# Patient Record
Sex: FEMALE | Race: WHITE | Employment: FULL TIME | URBAN - NONMETROPOLITAN AREA
[De-identification: names, ages, dates, MRNs, and addresses within clinical notes are randomized per-mention and may not be internally consistent; named-entity substitution may affect disease eponyms.]

---

## 2017-05-09 RX ORDER — LORATADINE 10 MG/1
TABLET ORAL
Qty: 30 TABLET | Refills: 3 | Status: SHIPPED | OUTPATIENT
Start: 2017-05-09 | End: 2018-06-29 | Stop reason: SDUPTHER

## 2017-05-10 RX ORDER — SERTRALINE HYDROCHLORIDE 100 MG/1
100 TABLET, FILM COATED ORAL DAILY
COMMUNITY
End: 2017-05-10 | Stop reason: SDUPTHER

## 2017-05-10 RX ORDER — SERTRALINE HYDROCHLORIDE 100 MG/1
TABLET, FILM COATED ORAL
Qty: 45 TABLET | Refills: 5 | Status: SHIPPED | OUTPATIENT
Start: 2017-05-10

## 2017-05-19 DIAGNOSIS — G47.30 UNSPECIFIED SLEEP APNEA: ICD-10-CM

## 2017-05-19 DIAGNOSIS — E03.9 UNSPECIFIED HYPOTHYROIDISM: ICD-10-CM

## 2017-05-19 RX ORDER — LEVOTHYROXINE SODIUM 0.15 MG/1
100 TABLET ORAL
COMMUNITY
Start: 2017-04-08

## 2017-05-19 RX ORDER — VITAMIN A ACETATE, .BETA.-CAROTENE, ASCORBIC ACID, CHOLECALCIFEROL, .ALPHA.-TOCOPHEROL ACETATE, DL-, THIAMINE MONONITRATE, RIBOFLAVIN, NIACINAMIDE, PYRIDOXINE HYDROCHLORIDE, FOLIC ACID, CYANOCOBALAMIN, CALCIUM CARBONATE, FERROUS FUMARATE, ZINC OXIDE, AND CUPRIC OXIDE 2000; 2000; 120; 400; 22; 1.84; 3; 20; 10; 1; 12; 200; 27; 25; 2 [IU]/1; [IU]/1; MG/1; [IU]/1; MG/1; MG/1; MG/1; MG/1; MG/1; MG/1; UG/1; MG/1; MG/1; MG/1; MG/1
TABLET ORAL
COMMUNITY
Start: 2017-04-24

## 2017-05-19 RX ORDER — CABERGOLINE 0.5 MG/1
TABLET ORAL
COMMUNITY
Start: 2017-04-06

## 2017-05-19 RX ORDER — MONTELUKAST SODIUM 10 MG/1
TABLET ORAL
COMMUNITY
Start: 2017-05-07 | End: 2017-10-09 | Stop reason: SDUPTHER

## 2017-05-19 RX ORDER — URSODIOL 500 MG/1
TABLET, FILM COATED ORAL
COMMUNITY
Start: 2017-04-24 | End: 2017-09-14 | Stop reason: ALTCHOICE

## 2017-05-19 RX ORDER — METHOCARBAMOL 750 MG/1
TABLET ORAL
COMMUNITY
Start: 2017-04-29

## 2017-05-25 ENCOUNTER — OFFICE VISIT (OUTPATIENT)
Dept: FAMILY MEDICINE CLINIC | Age: 47
End: 2017-05-25
Payer: MEDICARE

## 2017-05-25 VITALS
WEIGHT: 191 LBS | SYSTOLIC BLOOD PRESSURE: 102 MMHG | BODY MASS INDEX: 30.7 KG/M2 | HEART RATE: 64 BPM | HEIGHT: 66 IN | DIASTOLIC BLOOD PRESSURE: 60 MMHG

## 2017-05-25 DIAGNOSIS — Z13.220 SCREENING FOR LIPID DISORDERS: ICD-10-CM

## 2017-05-25 DIAGNOSIS — G47.30 UNSPECIFIED SLEEP APNEA: Primary | ICD-10-CM

## 2017-05-25 DIAGNOSIS — Z13.1 SCREENING FOR DIABETES MELLITUS: ICD-10-CM

## 2017-05-25 PROCEDURE — 99213 OFFICE O/P EST LOW 20 MIN: CPT | Performed by: FAMILY MEDICINE

## 2017-05-31 ENCOUNTER — HOSPITAL ENCOUNTER (OUTPATIENT)
Dept: SLEEP CENTER | Age: 47
Discharge: HOME | End: 2017-05-31
Payer: MEDICARE

## 2017-05-31 DIAGNOSIS — G47.30 UNSPECIFIED SLEEP APNEA: ICD-10-CM

## 2017-05-31 PROCEDURE — 95811 POLYSOM 6/>YRS CPAP 4/> PARM: CPT

## 2017-06-01 VITALS
DIASTOLIC BLOOD PRESSURE: 63 MMHG | RESPIRATION RATE: 20 BRPM | TEMPERATURE: 96.4 F | HEART RATE: 51 BPM | BODY MASS INDEX: 31.82 KG/M2 | SYSTOLIC BLOOD PRESSURE: 104 MMHG | WEIGHT: 191 LBS | HEIGHT: 65 IN

## 2017-09-14 ENCOUNTER — OFFICE VISIT (OUTPATIENT)
Dept: FAMILY MEDICINE CLINIC | Age: 47
End: 2017-09-14
Payer: MEDICARE

## 2017-09-14 VITALS
RESPIRATION RATE: 14 BRPM | WEIGHT: 193.1 LBS | SYSTOLIC BLOOD PRESSURE: 124 MMHG | DIASTOLIC BLOOD PRESSURE: 82 MMHG | HEIGHT: 65 IN | BODY MASS INDEX: 32.17 KG/M2 | TEMPERATURE: 99.1 F | HEART RATE: 72 BPM

## 2017-09-14 DIAGNOSIS — G47.00 INSOMNIA, UNSPECIFIED TYPE: Primary | ICD-10-CM

## 2017-09-14 DIAGNOSIS — K08.89 PAIN, DENTAL: ICD-10-CM

## 2017-09-14 PROCEDURE — 99213 OFFICE O/P EST LOW 20 MIN: CPT | Performed by: NURSE PRACTITIONER

## 2017-09-14 RX ORDER — ZOLPIDEM TARTRATE 5 MG/1
5 TABLET ORAL NIGHTLY PRN
Qty: 30 TABLET | Refills: 0 | Status: SHIPPED | OUTPATIENT
Start: 2017-09-14 | End: 2017-10-14

## 2017-09-14 ASSESSMENT — ENCOUNTER SYMPTOMS: SINUS PRESSURE: 1

## 2017-09-16 ASSESSMENT — ENCOUNTER SYMPTOMS
COUGH: 0
SHORTNESS OF BREATH: 0
WHEEZING: 0

## 2017-10-09 RX ORDER — MONTELUKAST SODIUM 10 MG/1
10 TABLET ORAL NIGHTLY
Qty: 30 TABLET | Refills: 5 | Status: SHIPPED | OUTPATIENT
Start: 2017-10-09

## 2018-10-05 RX ORDER — LORATADINE 10 MG/1
TABLET ORAL
Qty: 30 TABLET | Refills: 5 | Status: SHIPPED | OUTPATIENT
Start: 2018-10-05

## 2021-12-23 ENCOUNTER — HOSPITAL ENCOUNTER (OUTPATIENT)
Dept: MAMMOGRAPHY | Age: 51
Discharge: HOME | End: 2021-12-25
Payer: COMMERCIAL

## 2021-12-23 DIAGNOSIS — Z12.31 ENCOUNTER FOR SCREENING MAMMOGRAM FOR MALIGNANT NEOPLASM OF BREAST: ICD-10-CM

## 2021-12-23 PROCEDURE — 77067 SCR MAMMO BI INCL CAD: CPT

## 2022-01-28 ENCOUNTER — TELEPHONE (OUTPATIENT)
Dept: SURGERY | Age: 52
End: 2022-01-28

## 2022-07-12 ENCOUNTER — OFFICE VISIT (OUTPATIENT)
Dept: OTOLARYNGOLOGY | Age: 52
End: 2022-07-12
Payer: COMMERCIAL

## 2022-07-12 VITALS
TEMPERATURE: 60.8 F | DIASTOLIC BLOOD PRESSURE: 74 MMHG | SYSTOLIC BLOOD PRESSURE: 124 MMHG | BODY MASS INDEX: 32.16 KG/M2 | RESPIRATION RATE: 14 BRPM | WEIGHT: 193 LBS | HEART RATE: 61 BPM

## 2022-07-12 DIAGNOSIS — Z96.22 RETAINED MYRINGOTOMY TUBE IN LEFT EAR: ICD-10-CM

## 2022-07-12 DIAGNOSIS — H69.83 DYSFUNCTION OF BOTH EUSTACHIAN TUBES: ICD-10-CM

## 2022-07-12 DIAGNOSIS — Z01.818 PRE-OP TESTING: Primary | ICD-10-CM

## 2022-07-12 PROCEDURE — 99203 OFFICE O/P NEW LOW 30 MIN: CPT | Performed by: OTOLARYNGOLOGY

## 2022-07-12 RX ORDER — LAMOTRIGINE 100 MG/1
100 TABLET ORAL DAILY
COMMUNITY

## 2022-07-12 RX ORDER — ZOLPIDEM TARTRATE 10 MG/1
10 TABLET ORAL DAILY
COMMUNITY

## 2022-07-12 RX ORDER — ESCITALOPRAM OXALATE 20 MG/1
20 TABLET ORAL DAILY
COMMUNITY

## 2022-07-12 NOTE — PROGRESS NOTES
2022 8:19 AM EDRHINA Lazaro (:  1970) is a 50 y.o. female,New patient, here for evaluation of the following chief complaint(s):  Ear Problem (left tube fell inside eardrum )      ASSESSMENT/PLAN:  1. Pre-op testing    2. Retained myringotomy tube in left ear    3. Dysfunction of both eustachian tubes      1. Pre-op testing  -     CBC with Auto Differential; Future  -     Basic Metabolic Panel; Future  -     EKG 12 lead; Future  2. Retained myringotomy tube in left ear  3. Dysfunction of both eustachian tubes    Arrange for replacement of bilateral T tubes in the OR  Cbc, bmp, ekg     Discussed risks, benefits, indications, alternatives of myringotomy with ear tubes including but not limited to need for repeat tubes, otorrhea, pain, damage to surrounding structures, viral and bacterial upper respiratory infections that may cause ear infection despite having ear tubes in place, tube falling out too early, tube staying in too long, 1% risk of tympanic membrane perforation, mucus drainage, bleeding. No follow-ups on file. SUBJECTIVE/OBJECTIVE:  HPI   54yo woman bilateral T tubes by Dr. Elysa Koyanagi in . Non smoker. Has previously seen Dr. Timi Crowe. Has strong history of environmental allergies and gets itchy ears. Has worsening hearing loss in the left ear. Is wondering if the t tube on the right is out. The left tube is known to be in the middle ear.      Past Medical History:   Diagnosis Date    Asthma     Depression     Pituitary tumor     Seasonal allergies      Past Surgical History:   Procedure Laterality Date    DILATION AND CURETTAGE OF UTERUS      hysteroscopy d&c 13    HYSTERECTOMY (CERVIX STATUS UNKNOWN)  2015    SLEEVE GASTROPLASTY  2017    TRACHEOSTOMY      age 4    TYMPANOSTOMY TUBE PLACEMENT      age 3    TYMPANOSTOMY TUBE PLACEMENT      t-tubes     Social History     Tobacco History     Smoking Status  Never Smoker    Smokeless Tobacco Use  Never Used Alcohol History     Alcohol Use Status  No          Drug Use     Drug Use Status  No          Sexual Activity     Sexually Active  Yes              Family History   Problem Relation Age of Onset    No Known Problems Mother     No Known Problems Father     Coronary Art Dis Maternal Grandmother     Coronary Art Dis Maternal Grandfather     No Known Problems Paternal Grandmother     No Known Problems Paternal Grandfather     Breast Cancer Sister     No Known Problems Daughter     No Known Problems Maternal Aunt      Current Outpatient Medications   Medication Instructions    cabergoline (DOSTINEX) 0.5 MG tablet No dose, route, or frequency recorded.  Cyanocobalamin (VITAMIN B 12 PO) 1,000 mcg, SubLINGual    D3-50 29228 UNITS CAPS No dose, route, or frequency recorded.  escitalopram (LEXAPRO) 20 mg, Oral, DAILY    fluticasone (FLONASE) 50 MCG/ACT nasal spray PLACE TWO SPRAYS IN EACH NOSTRIL ONCE DAILY    IBUPROFEN PRN    lamoTRIgine (LAMICTAL) 100 mg, Oral, DAILY    levothyroxine (SYNTHROID) 100 mcg    loratadine (CLARITIN) 10 MG tablet TAKE ONE TABLET BY MOUTH DAILY    montelukast (SINGULAIR) 10 mg, Oral, NIGHTLY    Prenatal Vit-Fe Fumarate-FA (PNV PRENATAL PLUS MULTIVITAMIN) 27-1 MG TABS No dose, route, or frequency recorded.  sertraline (ZOLOFT) 100 MG tablet Take 1 1/2 tabs daily    zolpidem (AMBIEN) 10 mg, Oral, DAILY     Allergies   Allergen Reactions    Adhesive Tape Itching    Codeine Hives    Hydrocodone Hives    Hydromorphone Itching    Lactose Nausea Only       ENT ROS: positive for - hearing change    General: The patient is found to be alert and normally responsive female. Hearing: grossly normal   Voice: Clear   Skin: The skin has normal colour and turgor. Face: The facial contour is symmetric at rest and with movement. Ears: The pinnae have normal contours.     AD: EAC clear, TM with t tube in place, patent, extensive crusting around shaft   AS: EAC clear, TM intact, retraction, t tube in middle ear space   Eye: The ocular movements are full and symmetric, the conjunctiva is unremarkable; sclera are anicteric, pupillary response is symmetric. No nystagmus is found. Nose:   The external nasal contour is normal  The nasal mucosa has a normal appearance. The nasal septum is straight. The turbinates have a normal appearance  The nares are patent without evidence of polyposis   Oral cavity:   The dentition is healthy. The oral mucosa is without lesions;  the tongue is symmetric with full mobility and is without fasciculation. The soft palate is symmetric. The oropharynx is unremarkable. Neck: The neck has a normal contour; no masses are found on palpation        An electronic signature was used to authenticate this note.     --Maranda Servin MD     7/12/2022 8:19 AM EDT

## 2022-08-22 ENCOUNTER — HOSPITAL ENCOUNTER (OUTPATIENT)
Dept: LAB | Age: 52
Discharge: HOME | End: 2022-08-22
Payer: COMMERCIAL

## 2022-08-22 ENCOUNTER — TELEPHONE (OUTPATIENT)
Dept: OTOLARYNGOLOGY | Age: 52
End: 2022-08-22

## 2022-08-22 DIAGNOSIS — Z01.818 PRE-OP TESTING: ICD-10-CM

## 2022-08-22 LAB
ABSOLUTE EOS #: 0.19 K/UL (ref 0–0.44)
ABSOLUTE IMMATURE GRANULOCYTE: 0.12 K/UL (ref 0–0.3)
ABSOLUTE LYMPH #: 2.97 K/UL (ref 1.1–3.7)
ABSOLUTE MONO #: 0.66 K/UL (ref 0.1–1.2)
ANION GAP SERPL CALCULATED.3IONS-SCNC: 7 MMOL/L (ref 9–17)
BASOPHILS # BLD: 1 % (ref 0–2)
BASOPHILS ABSOLUTE: 0.07 K/UL (ref 0–0.2)
BUN BLDV-MCNC: 10 MG/DL (ref 6–20)
BUN/CREAT BLD: 13 (ref 9–20)
CALCIUM SERPL-MCNC: 9.5 MG/DL (ref 8.6–10.4)
CHLORIDE BLD-SCNC: 103 MMOL/L (ref 98–107)
CO2: 28 MMOL/L (ref 20–31)
CREAT SERPL-MCNC: 0.78 MG/DL (ref 0.5–0.9)
EOSINOPHILS RELATIVE PERCENT: 2 % (ref 1–4)
GFR AFRICAN AMERICAN: >60 ML/MIN
GFR NON-AFRICAN AMERICAN: >60 ML/MIN
GFR SERPL CREATININE-BSD FRML MDRD: ABNORMAL ML/MIN/{1.73_M2}
GLUCOSE BLD-MCNC: 74 MG/DL (ref 70–99)
HCT VFR BLD CALC: 38.7 % (ref 36.3–47.1)
HEMOGLOBIN: 12.9 G/DL (ref 11.9–15.1)
IMMATURE GRANULOCYTES: 2 %
LYMPHOCYTES # BLD: 36 % (ref 24–43)
MCH RBC QN AUTO: 30.9 PG (ref 25.2–33.5)
MCHC RBC AUTO-ENTMCNC: 33.3 G/DL (ref 25.2–33.5)
MCV RBC AUTO: 92.6 FL (ref 82.6–102.9)
MONOCYTES # BLD: 8 % (ref 3–12)
NRBC AUTOMATED: 0 PER 100 WBC
PDW BLD-RTO: 12.6 % (ref 11.8–14.4)
PLATELET # BLD: 253 K/UL (ref 138–453)
PMV BLD AUTO: 9.2 FL (ref 8.1–13.5)
POTASSIUM SERPL-SCNC: 4 MMOL/L (ref 3.7–5.3)
RBC # BLD: 4.18 M/UL (ref 3.95–5.11)
SEG NEUTROPHILS: 51 % (ref 36–65)
SEGMENTED NEUTROPHILS ABSOLUTE COUNT: 4.16 K/UL (ref 1.5–8.1)
SODIUM BLD-SCNC: 138 MMOL/L (ref 135–144)
WBC # BLD: 8.2 K/UL (ref 3.5–11.3)

## 2022-08-22 PROCEDURE — 36415 COLL VENOUS BLD VENIPUNCTURE: CPT

## 2022-08-22 PROCEDURE — 85025 COMPLETE CBC W/AUTO DIFF WBC: CPT

## 2022-08-22 PROCEDURE — 80048 BASIC METABOLIC PNL TOTAL CA: CPT

## 2022-08-22 NOTE — TELEPHONE ENCOUNTER
[facility]    Pre-Operative Evaluation/Consultation    Name:  Mary Ortega                                         Age:  50 y.o. MRN:  [id number]       :  1970   Date:  2022         Sex: female    There were no encounter diagnoses. Surgeon:  Dr. Radha Gordon  Procedure (Planned):  Bilateral Myringotomy with T tubes  Date Scheduled surgery: 2022    Attending : No att. providers found    Primary Physician: [place]  Cardiologist: None    Type of Anesthesia Requested: General    Patient Medical history: Allergies   Allergen Reactions    Adhesive Tape Itching    Codeine Hives    Hydrocodone Hives    Hydromorphone Itching    Lactose Nausea Only     Patient Active Problem List   Diagnosis    Sleep apnea    Hypothyroidism     Past Medical History:   Diagnosis Date    Asthma     Depression     Pituitary tumor     Seasonal allergies      Past Surgical History:   Procedure Laterality Date    DILATION AND CURETTAGE OF UTERUS      hysteroscopy d&c 15    HYSTERECTOMY (CERVIX STATUS UNKNOWN)      SLEEVE GASTROPLASTY  2017    TRACHEOSTOMY      age 3    TYMPANOSTOMY TUBE PLACEMENT      age 3    TYMPANOSTOMY TUBE PLACEMENT      t-tubes     Social History     Tobacco Use    Smoking status: Never    Smokeless tobacco: Never   Substance Use Topics    Alcohol use: No    Drug use: No     Medications:  Current Outpatient Medications   Medication Sig Dispense Refill    zolpidem (AMBIEN) 10 MG tablet Take 10 mg by mouth daily.       lamoTRIgine (LAMICTAL) 100 MG tablet Take 100 mg by mouth daily      escitalopram (LEXAPRO) 20 MG tablet Take 20 mg by mouth daily      loratadine (CLARITIN) 10 MG tablet TAKE ONE TABLET BY MOUTH DAILY 30 tablet 5    montelukast (SINGULAIR) 10 MG tablet Take 1 tablet by mouth nightly 30 tablet 5    Cyanocobalamin (VITAMIN B 12 PO) Place 1,000 mcg under the tongue      fluticasone (FLONASE) 50 MCG/ACT nasal spray PLACE TWO SPRAYS IN EACH NOSTRIL ONCE DAILY 1 Bottle 5    levothyroxine (SYNTHROID) 150 MCG tablet 100 mcg       Prenatal Vit-Fe Fumarate-FA (PNV PRENATAL PLUS MULTIVITAMIN) 27-1 MG TABS       D3-50 98822 UNITS CAPS       cabergoline (DOSTINEX) 0.5 MG tablet       sertraline (ZOLOFT) 100 MG tablet Take 1 1/2 tabs daily (Patient not taking: Reported on 7/12/2022) 45 tablet 5    IBUPROFEN by Does not apply route as needed. No current facility-administered medications for this visit. Scheduled Meds:  Continuous Infusions:  PRN Meds:. Prior to Admission medications    Medication Sig Start Date End Date Taking? Authorizing Provider   zolpidem (AMBIEN) 10 MG tablet Take 10 mg by mouth daily. Historical Provider, MD   lamoTRIgine (LAMICTAL) 100 MG tablet Take 100 mg by mouth daily    Historical Provider, MD   escitalopram (LEXAPRO) 20 MG tablet Take 20 mg by mouth daily    Historical Provider, MD   loratadine (CLARITIN) 10 MG tablet TAKE ONE TABLET BY MOUTH DAILY 10/5/18   Demetris Gee MD   montelukast (SINGULAIR) 10 MG tablet Take 1 tablet by mouth nightly 10/9/17   Gertrudis Peng MD   Cyanocobalamin (VITAMIN B 12 PO) Place 1,000 mcg under the tongue    Historical Provider, MD   fluticasone (FLONASE) 50 MCG/ACT nasal spray PLACE TWO SPRAYS IN EACH NOSTRIL ONCE DAILY 8/21/17   Gertrudis Peng MD   levothyroxine (SYNTHROID) 150 MCG tablet 100 mcg  4/8/17   Historical Provider, MD   Prenatal Vit-Fe Fumarate-FA (PNV PRENATAL PLUS MULTIVITAMIN) 27-1 MG TABS  4/24/17   Historical Provider, MD   D3-50 74137 UNITS CAPS  4/29/17   Historical Provider, MD   cabergoline (DOSTINEX) 0.5 MG tablet  4/6/17   Historical Provider, MD   sertraline (ZOLOFT) 100 MG tablet Take 1 1/2 tabs daily  Patient not taking: Reported on 7/12/2022 5/10/17   Gertrudis Peng MD   IBUPROFEN by Does not apply route as needed.     Historical Provider, MD     Vital Signs (Current) [unfilled]    Weight:   Wt Readings from Last 1 Encounters:   07/12/22 193 lb (87.5 kg) Height:   Ht Readings from Last 1 Encounters:   09/14/17 5' 4.96\" (1.65 m)      BMI:  There is no height or weight on file to calculate BMI. Estimated body mass index is 32.16 kg/m² as calculated from the following:    Height as of 9/14/17: 5' 4.96\" (1.65 m). Weight as of 7/12/22: 193 lb (87.5 kg). body mass index is unknown because there is no height or weight on file. Cardiac Clearance: None   Medical Clearance:None   Appointment for surgery Clearance scheduled for:None     Preoperative Testing: These are the current and completed labs:  CBC:   Lab Results   Component Value Date/Time    WBC 8.2 08/22/2022 01:55 PM    RBC 4.18 08/22/2022 01:55 PM    HGB 12.9 08/22/2022 01:55 PM    HCT 38.7 08/22/2022 01:55 PM    MCV 92.6 08/22/2022 01:55 PM    RDW 12.6 08/22/2022 01:55 PM     08/22/2022 01:55 PM     CMP:   Lab Results   Component Value Date/Time     08/22/2022 01:55 PM    K 4.0 08/22/2022 01:55 PM     08/22/2022 01:55 PM    CO2 28 08/22/2022 01:55 PM    BUN 10 08/22/2022 01:55 PM    CREATININE 0.78 08/22/2022 01:55 PM    GFRAA >60 08/22/2022 01:55 PM    LABGLOM >60 08/22/2022 01:55 PM    GLUCOSE 74 08/22/2022 01:55 PM    CALCIUM 9.5 08/22/2022 01:55 PM     POC Tests: No results for input(s): POCGLU, POCNA, POCK, POCCL, POCBUN, POCHEMO, POCHCT in the last 72 hours.   Coags  No results found for: PROTIME, INR, APTT  HCG (If Applicable)   Lab Results   Component Value Date    PREGTESTUR NEGATIVE 07/19/2013      ABGs No results found for: PHART, PO2ART, XYN0ZEX, DWV8BOQ, BEART, S5BZCWTW   Type & Screen (If Applicable)  No results found for: Vaughn Torres    Additional ordered pre-operative testing:  [x]CBC    []ABG      [x] BMP   []URINALYSIS   []CMP    []HCG   []COAGS PT/INR  []T&C  []LFTs   []TYPE AND SCREEN    [x] EKG  [] Chest X-Ray  [] Other Radiology    [] Sent to Hospitalist None  [x] Sent to Anesthesia for your review: None   [] Additional Orders: None     Comments:None

## 2022-08-23 ENCOUNTER — ANESTHESIA EVENT (OUTPATIENT)
Dept: OPERATING ROOM | Age: 52
End: 2022-08-23
Payer: COMMERCIAL

## 2022-08-23 ENCOUNTER — HOSPITAL ENCOUNTER (OUTPATIENT)
Dept: NON INVASIVE DIAGNOSTICS | Age: 52
Discharge: HOME | End: 2022-08-23
Payer: COMMERCIAL

## 2022-08-23 DIAGNOSIS — Z01.818 PRE-OP TESTING: ICD-10-CM

## 2022-08-23 PROCEDURE — 93005 ELECTROCARDIOGRAM TRACING: CPT

## 2022-08-23 RX ORDER — SODIUM CHLORIDE, SODIUM LACTATE, POTASSIUM CHLORIDE, CALCIUM CHLORIDE 600; 310; 30; 20 MG/100ML; MG/100ML; MG/100ML; MG/100ML
INJECTION, SOLUTION INTRAVENOUS CONTINUOUS
Status: CANCELLED | OUTPATIENT
Start: 2022-08-23

## 2022-08-23 RX ORDER — SODIUM CHLORIDE 0.9 % (FLUSH) 0.9 %
5-40 SYRINGE (ML) INJECTION EVERY 12 HOURS SCHEDULED
Status: CANCELLED | OUTPATIENT
Start: 2022-08-23

## 2022-08-23 RX ORDER — SODIUM CHLORIDE 9 MG/ML
INJECTION, SOLUTION INTRAVENOUS PRN
Status: CANCELLED | OUTPATIENT
Start: 2022-08-23

## 2022-08-23 RX ORDER — SODIUM CHLORIDE 0.9 % (FLUSH) 0.9 %
5-40 SYRINGE (ML) INJECTION PRN
Status: CANCELLED | OUTPATIENT
Start: 2022-08-23

## 2022-08-23 NOTE — H&P
Use Status  No                  Sexual Activity           Sexually Active  Yes                  Family History         Family History   Problem Relation Age of Onset    No Known Problems Mother      No Known Problems Father      Coronary Art Dis Maternal Grandmother      Coronary Art Dis Maternal Grandfather      No Known Problems Paternal Grandmother      No Known Problems Paternal Grandfather      Breast Cancer Sister      No Known Problems Daughter      No Known Problems Maternal Aunt                Current Outpatient Medications   Medication Instructions    cabergoline (DOSTINEX) 0.5 MG tablet No dose, route, or frequency recorded. Cyanocobalamin (VITAMIN B 12 PO) 1,000 mcg, SubLINGual    D3-50 83573 UNITS CAPS No dose, route, or frequency recorded. escitalopram (LEXAPRO) 20 mg, Oral, DAILY    fluticasone (FLONASE) 50 MCG/ACT nasal spray PLACE TWO SPRAYS IN EACH NOSTRIL ONCE DAILY    IBUPROFEN PRN    lamoTRIgine (LAMICTAL) 100 mg, Oral, DAILY    levothyroxine (SYNTHROID) 100 mcg    loratadine (CLARITIN) 10 MG tablet TAKE ONE TABLET BY MOUTH DAILY    montelukast (SINGULAIR) 10 mg, Oral, NIGHTLY    Prenatal Vit-Fe Fumarate-FA (PNV PRENATAL PLUS MULTIVITAMIN) 27-1 MG TABS No dose, route, or frequency recorded. sertraline (ZOLOFT) 100 MG tablet Take 1 1/2 tabs daily    zolpidem (AMBIEN) 10 mg, Oral, DAILY           Allergies   Allergen Reactions    Adhesive Tape Itching    Codeine Hives    Hydrocodone Hives    Hydromorphone Itching    Lactose Nausea Only         ENT ROS: positive for - hearing change     General: The patient is found to be alert and normally responsive female. Hearing: grossly normal   Voice: Clear   Skin: The skin has normal colour and turgor. Face: The facial contour is symmetric at rest and with movement. Ears: The pinnae have normal contours.     AD: EAC clear, TM with t tube in place, patent, extensive crusting around shaft   AS: EAC clear, TM intact, retraction, t tube in middle ear space   Eye: The ocular movements are full and symmetric, the conjunctiva is unremarkable; sclera are anicteric, pupillary response is symmetric. No nystagmus is found. Nose:   The external nasal contour is normal  The nasal mucosa has a normal appearance. The nasal septum is straight. The turbinates have a normal appearance  The nares are patent without evidence of polyposis   Oral cavity:   The dentition is healthy. The oral mucosa is without lesions;  the tongue is symmetric with full mobility and is without fasciculation. The soft palate is symmetric. The oropharynx is unremarkable.   Neck: The neck has a normal contour; no masses are found on palpation

## 2022-08-24 ENCOUNTER — ANESTHESIA (OUTPATIENT)
Dept: OPERATING ROOM | Age: 52
End: 2022-08-24
Payer: COMMERCIAL

## 2022-08-24 ENCOUNTER — HOSPITAL ENCOUNTER (OUTPATIENT)
Age: 52
Setting detail: OUTPATIENT SURGERY
Discharge: HOME | End: 2022-08-24
Attending: OTOLARYNGOLOGY | Admitting: OTOLARYNGOLOGY
Payer: COMMERCIAL

## 2022-08-24 VITALS
RESPIRATION RATE: 16 BRPM | OXYGEN SATURATION: 97 % | HEART RATE: 64 BPM | WEIGHT: 236 LBS | TEMPERATURE: 97 F | HEIGHT: 65 IN | DIASTOLIC BLOOD PRESSURE: 65 MMHG | SYSTOLIC BLOOD PRESSURE: 119 MMHG | BODY MASS INDEX: 39.32 KG/M2

## 2022-08-24 LAB
EKG ATRIAL RATE: 68 BPM
EKG P AXIS: 62 DEGREES
EKG P-R INTERVAL: 168 MS
EKG Q-T INTERVAL: 420 MS
EKG QRS DURATION: 80 MS
EKG QTC CALCULATION (BAZETT): 446 MS
EKG R AXIS: 57 DEGREES
EKG T AXIS: 78 DEGREES
EKG VENTRICULAR RATE: 68 BPM

## 2022-08-24 PROCEDURE — 69436 CREATE EARDRUM OPENING: CPT | Performed by: OTOLARYNGOLOGY

## 2022-08-24 PROCEDURE — 2580000003 HC RX 258: Performed by: OTOLARYNGOLOGY

## 2022-08-24 PROCEDURE — L8699 PROSTHETIC IMPLANT NOS: HCPCS | Performed by: OTOLARYNGOLOGY

## 2022-08-24 PROCEDURE — 7100000010 HC PHASE II RECOVERY - FIRST 15 MIN: Performed by: OTOLARYNGOLOGY

## 2022-08-24 PROCEDURE — 7100000011 HC PHASE II RECOVERY - ADDTL 15 MIN: Performed by: OTOLARYNGOLOGY

## 2022-08-24 PROCEDURE — 3600000012 HC SURGERY LEVEL 2 ADDTL 15MIN: Performed by: OTOLARYNGOLOGY

## 2022-08-24 PROCEDURE — 6360000002 HC RX W HCPCS: Performed by: REGISTERED NURSE

## 2022-08-24 PROCEDURE — 3700000000 HC ANESTHESIA ATTENDED CARE: Performed by: OTOLARYNGOLOGY

## 2022-08-24 PROCEDURE — 2709999900 HC NON-CHARGEABLE SUPPLY: Performed by: OTOLARYNGOLOGY

## 2022-08-24 PROCEDURE — 3600000002 HC SURGERY LEVEL 2 BASE: Performed by: OTOLARYNGOLOGY

## 2022-08-24 PROCEDURE — 3700000001 HC ADD 15 MINUTES (ANESTHESIA): Performed by: OTOLARYNGOLOGY

## 2022-08-24 PROCEDURE — 6370000000 HC RX 637 (ALT 250 FOR IP): Performed by: OTOLARYNGOLOGY

## 2022-08-24 PROCEDURE — 2500000003 HC RX 250 WO HCPCS: Performed by: REGISTERED NURSE

## 2022-08-24 DEVICE — VENT TUBE 1016010 5PK GOODE T 12MM SILIC
Type: IMPLANTABLE DEVICE | Site: EAR | Status: FUNCTIONAL
Brand: GOODE T-TUBE®

## 2022-08-24 DEVICE — VENT TUBE 1087701 5PK GOODE T 1.14X12MM
Type: IMPLANTABLE DEVICE | Site: EAR | Status: FUNCTIONAL
Brand: GOODE T-TUBE®

## 2022-08-24 RX ORDER — SODIUM CHLORIDE, SODIUM LACTATE, POTASSIUM CHLORIDE, CALCIUM CHLORIDE 600; 310; 30; 20 MG/100ML; MG/100ML; MG/100ML; MG/100ML
INJECTION, SOLUTION INTRAVENOUS CONTINUOUS
Status: DISCONTINUED | OUTPATIENT
Start: 2022-08-24 | End: 2022-08-24 | Stop reason: HOSPADM

## 2022-08-24 RX ORDER — LIDOCAINE HYDROCHLORIDE 20 MG/ML
INJECTION, SOLUTION INFILTRATION; PERINEURAL PRN
Status: DISCONTINUED | OUTPATIENT
Start: 2022-08-24 | End: 2022-08-24 | Stop reason: SDUPTHER

## 2022-08-24 RX ORDER — PROPOFOL 10 MG/ML
INJECTION, EMULSION INTRAVENOUS PRN
Status: DISCONTINUED | OUTPATIENT
Start: 2022-08-24 | End: 2022-08-24 | Stop reason: SDUPTHER

## 2022-08-24 RX ORDER — KETOROLAC TROMETHAMINE 30 MG/ML
INJECTION, SOLUTION INTRAMUSCULAR; INTRAVENOUS PRN
Status: DISCONTINUED | OUTPATIENT
Start: 2022-08-24 | End: 2022-08-24 | Stop reason: SDUPTHER

## 2022-08-24 RX ORDER — CIPROFLOXACIN HYDROCHLORIDE 3.5 MG/ML
SOLUTION/ DROPS TOPICAL PRN
Status: DISCONTINUED | OUTPATIENT
Start: 2022-08-24 | End: 2022-08-24 | Stop reason: ALTCHOICE

## 2022-08-24 RX ADMIN — PROPOFOL 50 MG: 10 INJECTION, EMULSION INTRAVENOUS at 11:31

## 2022-08-24 RX ADMIN — KETOROLAC TROMETHAMINE 30 MG: 30 INJECTION, SOLUTION INTRAMUSCULAR; INTRAVENOUS at 11:47

## 2022-08-24 RX ADMIN — PROPOFOL 30 MG: 10 INJECTION, EMULSION INTRAVENOUS at 11:43

## 2022-08-24 RX ADMIN — PROPOFOL 40 MG: 10 INJECTION, EMULSION INTRAVENOUS at 11:32

## 2022-08-24 RX ADMIN — SODIUM CHLORIDE, POTASSIUM CHLORIDE, SODIUM LACTATE AND CALCIUM CHLORIDE: 600; 310; 30; 20 INJECTION, SOLUTION INTRAVENOUS at 11:48

## 2022-08-24 RX ADMIN — PROPOFOL 30 MG: 10 INJECTION, EMULSION INTRAVENOUS at 11:38

## 2022-08-24 RX ADMIN — LIDOCAINE HYDROCHLORIDE 100 MG: 20 INJECTION, SOLUTION INFILTRATION; PERINEURAL at 11:31

## 2022-08-24 RX ADMIN — SODIUM CHLORIDE, POTASSIUM CHLORIDE, SODIUM LACTATE AND CALCIUM CHLORIDE: 600; 310; 30; 20 INJECTION, SOLUTION INTRAVENOUS at 10:11

## 2022-08-24 ASSESSMENT — PAIN - FUNCTIONAL ASSESSMENT: PAIN_FUNCTIONAL_ASSESSMENT: 0-10

## 2022-08-24 NOTE — OP NOTE
Operative Note      Patient: Sondra Cameron  YOB: 1970  MRN: [id number]    Date of Procedure: 8/24/2022    Pre-Op Diagnosis: Retained myringotomy tube in left ear [Z96.22]  Chronic Eustachian tube dysfunction, bilateral [H69.83]    Post-Op Diagnosis: Same       Procedure(s):  Bilateral T-Tube Insertion    Surgeon(s):  Ceci Roman MD    Assistant:   * No surgical staff found *    Anesthesia: General    Estimated Blood Loss (mL): Minimal    Complications: None    Specimens:   * No specimens in log *    Implants:  Implant Name Type Inv. Item Serial No.  Lot No. LRB No. Used Action   TUBE VENT L12MM ID1. 14MM JF LENORE T - [id number]  TUBE VENT L12MM ID1. 14MM JF LENORE T  [employer] [id number] Left 1 Implanted   TUBE TYMPANOTOMY SILI BLUE LENORE MICROGEL 1.14MM ID 12MM L - [id number]  TUBE TYMPANOTOMY SILI BLUE LENORE MICROGEL 1.14MM ID 12MM L  [employer] [place] [employer] [id number] Right 1 Implanted         Drains: * No LDAs found *    Findings: T tube in left middle ear space  T tube in place with crusting in right ear     Detailed Description of Procedure:   Patient correctly identified in pre op holding. Taken to OR and placed supine. Placed under general anesthesia. Operating microscope brought into the field. Patient prepped and draped in usual sterile fashion. Time out performed. Starting on the right ear, speculum inserted. Cerumen removed with curette. Retained t tube with surrounding crusting removed. New T tube placed into existing myringotomy. No effusion in right middle ear. Placement confirmed with taylor. Ciprodex otic gtt placed. Cotton ball placed in conchal bowl. Operating microscope moved to left ear. Speculum inserted. Cerumen removed with curette. Myringotomy blade used to make radial incision anterior-inferior quadrant over T-tube in the middle ear. Alligator used to remove T-tube from middle ear space. New T-tube placed the myringotomy incision.   Placement confirmed with Solitario Lipps. Ciprodex otic drops placed. Cotton ball placed in chondral bowl. Tolerated well. Taken to pacu in stable condition.        Electronically signed by Ebenezer Flores MD on 8/24/2022 at 11:48 AM

## 2022-08-24 NOTE — ANESTHESIA PRE PROCEDURE
Department of Anesthesiology  Preprocedure Note       Name:  Ramon Parks   Age:  50 y.o.  :  1970                                          MRN:  [id number]         Date:  2022      Surgeon: Sara Mullins):  Carson Cuellar MD    Procedure: Procedure(s):  Bilateral T-Tube Insertion    Medications prior to admission:   Prior to Admission medications    Medication Sig Start Date End Date Taking? Authorizing Provider   zolpidem (AMBIEN) 10 MG tablet Take 10 mg by mouth daily. Historical Provider, MD   lamoTRIgine (LAMICTAL) 100 MG tablet Take 100 mg by mouth daily    Historical Provider, MD   escitalopram (LEXAPRO) 20 MG tablet Take 20 mg by mouth daily    Historical Provider, MD   loratadine (CLARITIN) 10 MG tablet TAKE ONE TABLET BY MOUTH DAILY 10/5/18   Demetris Gee MD   montelukast (SINGULAIR) 10 MG tablet Take 1 tablet by mouth nightly 10/9/17   Madyson Greene MD   Cyanocobalamin (VITAMIN B 12 PO) Place 1,000 mcg under the tongue    Historical Provider, MD   fluticasone (FLONASE) 50 MCG/ACT nasal spray PLACE TWO SPRAYS IN EACH NOSTRIL ONCE DAILY 17   Madyson Greene MD   levothyroxine (SYNTHROID) 150 MCG tablet 100 mcg  17   Historical Provider, MD   Prenatal Vit-Fe Fumarate-FA (PNV PRENATAL PLUS MULTIVITAMIN) 27-1 MG TABS  17   Historical Provider, MD   D3-50 66941 UNITS CAPS  17   Historical Provider, MD   cabergoline (DOSTINEX) 0.5 MG tablet  17   Historical Provider, MD   sertraline (ZOLOFT) 100 MG tablet Take 1 1/2 tabs daily  Patient not taking: Reported on 2022 5/10/17   Madyson Greene MD   IBUPROFEN by Does not apply route as needed. Historical Provider, MD       Current medications:    Current Facility-Administered Medications   Medication Dose Route Frequency Provider Last Rate Last Admin    lactated ringers infusion   IntraVENous Continuous Carson Cuellar  mL/hr at 22 1011 New Bag at 22 1011       Allergies:     Allergies   Allergen Reactions    Adhesive Tape Itching    Codeine Hives    Dilaudid [Hydromorphone Hcl] Hives     Ok if takes benedryl    Hydrocodone Hives    Hydromorphone Itching    Lactose Nausea Only       Problem List:    Patient Active Problem List   Diagnosis Code    Sleep apnea G47.30    Hypothyroidism E03.9       Past Medical History:        Diagnosis Date    Asthma     Depression     Pituitary tumor     Seasonal allergies        Past Surgical History:        Procedure Laterality Date    DILATION AND CURETTAGE OF UTERUS      hysteroscopy d&c 7/19/13    HYSTERECTOMY (CERVIX STATUS UNKNOWN)  2015    SLEEVE GASTROPLASTY  01/2017    TRACHEOSTOMY      age 4    TYMPANOSTOMY TUBE PLACEMENT      age 3    TYMPANOSTOMY TUBE PLACEMENT  2014    t-tubes       Social History:    Social History     Tobacco Use    Smoking status: Never    Smokeless tobacco: Never   Substance Use Topics    Alcohol use: No                                Counseling given: Not Answered      Vital Signs (Current):   Vitals:    08/24/22 0956   BP: 118/70   Pulse: 66   Resp: 16   Temp: 36.1 °C (97 °F)   TempSrc: Tympanic   SpO2: 97%   Weight: 236 lb (107 kg)   Height: 5' 5\" (1.651 m)                                              BP Readings from Last 3 Encounters:   08/24/22 118/70   07/12/22 124/74   09/14/17 124/82       NPO Status: Time of last liquid consumption: 2200                        Time of last solid consumption: 2200                        Date of last liquid consumption: 08/23/22                        Date of last solid food consumption: 08/23/22    BMI:   Wt Readings from Last 3 Encounters:   08/24/22 236 lb (107 kg)   07/12/22 193 lb (87.5 kg)   09/14/17 193 lb 1.6 oz (87.6 kg)     Body mass index is 39.27 kg/m².     CBC:   Lab Results   Component Value Date/Time    WBC 8.2 08/22/2022 01:55 PM    RBC 4.18 08/22/2022 01:55 PM    HGB 12.9 08/22/2022 01:55 PM    HCT 38.7 08/22/2022 01:55 PM    MCV 92.6 08/22/2022 01:55 PM    RDW team.                    Belgica Hartman, APRN - CRNA   8/24/2022

## 2022-08-24 NOTE — ANESTHESIA POSTPROCEDURE EVALUATION
Department of Anesthesiology  Postprocedure Note    Patient: Suzette Galarza  MRN: [id number]  YOB: 1970  Date of evaluation: 8/24/2022      Procedure Summary     Date: 08/24/22 Room / Location: [address] [place] [address]    Anesthesia Start: 1127 Anesthesia Stop: 0998    Procedure: Bilateral T-Tube Insertion (Bilateral: Ear) Diagnosis:       Retained myringotomy tube in left ear      Chronic Eustachian tube dysfunction, bilateral      (Retained myringotomy tube in left ear [Z96.22])      (Chronic Eustachian tube dysfunction, bilateral [H69.83])    Surgeons: Mason Zimmerman MD Responsible Provider: LUISA Maradiaga CRNA    Anesthesia Type: general ASA Status: 3          Anesthesia Type: No value filed.     Rocío Phase I: Rocío Score: 10    Rocío Phase II: Rocío Score: 10      Anesthesia Post Evaluation    Patient location during evaluation: PACU  Patient participation: complete - patient participated  Level of consciousness: awake and alert  Pain score: 2  Airway patency: patent  Nausea & Vomiting: no nausea and no vomiting  Complications: no  Cardiovascular status: blood pressure returned to baseline and hemodynamically stable  Respiratory status: acceptable, spontaneous ventilation and room air  Hydration status: euvolemic

## 2022-08-24 NOTE — DISCHARGE INSTRUCTIONS
Ok to remove the cotton in the ear canals night of surgery or next morning after surgery  Continue ciprofloxacin ear drops, 4 drops to both ears two times a day for 5 days (bottle from OR to be given to parents after surgery)   Keep both ears clean and dry  Ok to bath/shower day after surgery but do not submerge head under any kind of water while the tubes are in place unless wearing ear plugs or swimming headband   Bleeding, drainage, and oozing normal for 1-3 days after ear tubes  May use small cotton ball if needed for oozing      MYRINGOTOMY AND TUBES  POST OPERATIVE INSTRUCTIONS    1. There may be some slight blood-tinged oozing from the ears after surgery. This is because we have placed drops in the ears to flush out any small amounts of blood. 2.  You may be given a small bottle of drops to place in the ears. Place the drops in both ears 4 drops two times a day until gone. If it appears that the drops are burning or stinging, then discontinue them. The bottle may indicate that these are eye drops, but they are intended to be placed in the ears. Use the drops as instructed. 3. Clear liquids should be given after the anesthesia and diet can be increased to a regular diet as tolerated. 4. Play and other activities are not restricted after the effects of anesthesia have been cleared. The tubes cannot be dislodged by activity. School may be resumed the day following surgery. 5. Pain is uncommon after the placement of tubes. If it appears that there is some discomfort, then Tylenol or Motrin may be given every 4-6 hours. 6. Antibiotics may be prescribed and should be taken as directed until the entire prescription is completed. Please notify us for any reactions or allergies. 7. A low grade fever may be encountered after the anesthesia. This may also be treated with Tylenol. Call if temperature exceeds 101.5 degrees.     8.  Water precautions are important as long as the tubes remain in the eardrums. The ears should not be immersed in water unless ear plugs are in place. Ok to not wear ear plugs in bath or shower. 9. Please call the office for an appointment 2 weeks after surgery. We would be happy to schedule this appointment for you prior to your child being discharged. If you have questions or concerns please call [facility] and [facility] @ [phone],  or after normal business hours call [facility] @ [phone] and ask to have your physician paged.

## 2022-09-09 ENCOUNTER — OFFICE VISIT (OUTPATIENT)
Dept: OTOLARYNGOLOGY | Age: 52
End: 2022-09-09
Payer: COMMERCIAL

## 2022-09-09 VITALS
BODY MASS INDEX: 39.32 KG/M2 | SYSTOLIC BLOOD PRESSURE: 118 MMHG | RESPIRATION RATE: 16 BRPM | WEIGHT: 236 LBS | OXYGEN SATURATION: 98 % | HEART RATE: 63 BPM | HEIGHT: 65 IN | DIASTOLIC BLOOD PRESSURE: 68 MMHG

## 2022-09-09 DIAGNOSIS — Z96.22 S/P MYRINGOTOMY WITH INSERTION OF TUBE: ICD-10-CM

## 2022-09-09 DIAGNOSIS — H69.83 DYSFUNCTION OF BOTH EUSTACHIAN TUBES: Primary | ICD-10-CM

## 2022-09-09 PROCEDURE — 99213 OFFICE O/P EST LOW 20 MIN: CPT | Performed by: OTOLARYNGOLOGY

## 2022-09-09 NOTE — PROGRESS NOTES
2022 8:19 AM EDT  Fiona Conley (:  1970) is a 50 y.o. female,New patient, here for evaluation of the following chief complaint(s):  Ear Problem (2 wk post tubes)      ASSESSMENT/PLAN:  1. Dysfunction of both eustachian tubes    2. S/P myringotomy with insertion of tube      1. Dysfunction of both eustachian tubes  2. S/P myringotomy with insertion of tube    Fu in 3 months for ear tube check     No follow-ups on file. SUBJECTIVE/OBJECTIVE:  HPI   54yo woman bilateral T tubes by Dr. Lorenza Toure in . Non smoker. Has previously seen  [facility] [place]. Has strong history of environmental allergies and gets itchy ears. Has worsening hearing loss in the left ear. Is wondering if the t tube on the right is out. The left tube is known to be in the middle ear. Is s/p bilateral myringotomy with ear T tubes on 22. Some drainage from both ears. Has dried up. Some discomfort in the left ear.      Past Medical History:   Diagnosis Date    Asthma     Depression     Pituitary tumor     Seasonal allergies      Past Surgical History:   Procedure Laterality Date    DILATION AND CURETTAGE OF UTERUS      hysteroscopy d&c 15    HYSTERECTOMY (CERVIX STATUS UNKNOWN)  2015    MYRINGOTOMY Bilateral 2022    Bilateral T-Tube Insertion performed by Socorro Greene MD at [facility] [address] GASTROPLASTY  2017    TRACHEOSTOMY      age 3    TYMPANOSTOMY TUBE PLACEMENT      age 3    TYMPANOSTOMY TUBE PLACEMENT  2014    t-tubes     Social History       Tobacco History       Smoking Status  Never Smoker      Smokeless Tobacco Use  Never Used              Alcohol History       Alcohol Use Status  No              Drug Use       Drug Use Status  No              Sexual Activity       Sexually Active  Yes                  Family History   Problem Relation Age of Onset    No Known Problems Mother     No Known Problems Father     Coronary Art Dis Maternal Grandmother     Coronary Art Dis Maternal Grandfather     No Known Problems Paternal Grandmother     No Known Problems Paternal Grandfather     Breast Cancer Sister     No Known Problems Daughter     No Known Problems Maternal Aunt      Current Outpatient Medications   Medication Instructions    cabergoline (DOSTINEX) 0.5 MG tablet No dose, route, or frequency recorded. Cyanocobalamin (VITAMIN B 12 PO) 1,000 mcg, SubLINGual    D3-50 09930 UNITS CAPS No dose, route, or frequency recorded. escitalopram (LEXAPRO) 20 mg, Oral, DAILY    fluticasone (FLONASE) 50 MCG/ACT nasal spray PLACE TWO SPRAYS IN EACH NOSTRIL ONCE DAILY    IBUPROFEN PRN    lamoTRIgine (LAMICTAL) 100 mg, Oral, DAILY    levothyroxine (SYNTHROID) 100 mcg    loratadine (CLARITIN) 10 MG tablet TAKE ONE TABLET BY MOUTH DAILY    montelukast (SINGULAIR) 10 mg, Oral, NIGHTLY    Prenatal Vit-Fe Fumarate-FA (PNV PRENATAL PLUS MULTIVITAMIN) 27-1 MG TABS No dose, route, or frequency recorded. sertraline (ZOLOFT) 100 MG tablet Take 1 1/2 tabs daily    zolpidem (AMBIEN) 10 mg, Oral, DAILY     Allergies   Allergen Reactions    Latex Itching    Acetaminophen Hives     Other reaction(s): hives    Adhesive Tape Itching    Codeine Hives    Dilaudid [Hydromorphone Hcl] Hives     Ok if takes benedryl    Hydrocodone Hives     Other reaction(s): hives    Hydromorphone Itching    Ketorolac Tromethamine Other (See Comments)    Lactose Nausea Only       ENT ROS: positive for - hearing change    General: The patient is found to be alert and normally responsive female. Hearing: grossly normal   Voice: Clear   Skin: The skin has normal colour and turgor. Face: The facial contour is symmetric at rest and with movement. Ears: The pinnae have normal contours. AD: EAC clear, TM with t tube in place, patent  AS: EAC clear, TM with t tube in place, patent   Eye: The ocular movements are full and symmetric, the conjunctiva is unremarkable; sclera are anicteric, pupillary response is symmetric. No nystagmus is found.     Nose:   The external nasal contour is normal  The nasal mucosa has a normal appearance. The nasal septum is straight. The turbinates have a normal appearance  The nares are patent without evidence of polyposis   Oral cavity:   The dentition is healthy. The oral mucosa is without lesions;  the tongue is symmetric with full mobility and is without fasciculation. The soft palate is symmetric. The oropharynx is unremarkable. Neck: The neck has a normal contour; no masses are found on palpation        An electronic signature was used to authenticate this note.     --Pedro Dumont MD     9/9/2022 8:19 AM EDT

## 2022-12-16 ENCOUNTER — OFFICE VISIT (OUTPATIENT)
Dept: OTOLARYNGOLOGY | Age: 52
End: 2022-12-16
Payer: COMMERCIAL

## 2022-12-16 VITALS
BODY MASS INDEX: 40.32 KG/M2 | TEMPERATURE: 98.8 F | WEIGHT: 242 LBS | SYSTOLIC BLOOD PRESSURE: 110 MMHG | DIASTOLIC BLOOD PRESSURE: 82 MMHG | HEART RATE: 76 BPM | HEIGHT: 65 IN

## 2022-12-16 DIAGNOSIS — H69.83 DYSFUNCTION OF BOTH EUSTACHIAN TUBES: Primary | ICD-10-CM

## 2022-12-16 DIAGNOSIS — Z96.22 S/P MYRINGOTOMY WITH INSERTION OF TUBE: ICD-10-CM

## 2022-12-16 PROCEDURE — 99213 OFFICE O/P EST LOW 20 MIN: CPT | Performed by: OTOLARYNGOLOGY

## 2022-12-16 NOTE — PROGRESS NOTES
2022 8:19 AM EDT  Patricia Rios (:  1970) is a 50 y.o. female,New patient, here for evaluation of the following chief complaint(s): Other (3 month tube check bilateral Some left ear ear ache, clear drainage every day, itching)      ASSESSMENT/PLAN:  1. Dysfunction of both eustachian tubes    2. S/P myringotomy with insertion of tube      1. Dysfunction of both eustachian tubes  2. S/P myringotomy with insertion of tube    Fu in 3 months for ear tube check w pcp     No follow-ups on file. SUBJECTIVE/OBJECTIVE:  HPI   54yo woman bilateral T tubes by Dr. Lexx Gimenez in . Non smoker. Has previously seen Dr. Bard Shaver. Has strong history of environmental allergies and gets itchy ears. Has worsening hearing loss in the left ear. Is wondering if the t tube on the right is out. The left tube is known to be in the middle ear. Is s/p bilateral myringotomy with ear T tubes on 22. Some drainage from both ears. Has dried up. Some discomfort in the left ear. Some left ache and clear drainage and itching.      Past Medical History:   Diagnosis Date    Asthma     Depression     Pituitary tumor     Seasonal allergies      Past Surgical History:   Procedure Laterality Date    DILATION AND CURETTAGE OF UTERUS      hysteroscopy d&c 15    HYSTERECTOMY (CERVIX STATUS UNKNOWN)  2015    MYRINGOTOMY Bilateral 2022    Bilateral T-Tube Insertion performed by Darron Sauceda MD at [facility] [address] GASTROPLASTY  2017    TRACHEOSTOMY      age 3    TYMPANOSTOMY TUBE PLACEMENT      age 3    TYMPANOSTOMY TUBE PLACEMENT  2014    t-tubes     Social History       Tobacco History       Smoking Status  Never Smoker      Smokeless Tobacco Use  Never Used              Alcohol History       Alcohol Use Status  No              Drug Use       Drug Use Status  No              Sexual Activity       Sexually Active  Yes                  Family History   Problem Relation Age of Onset    No Known Problems Mother     No Known Problems Father     Coronary Art Dis Maternal Grandmother     Coronary Art Dis Maternal Grandfather     No Known Problems Paternal Grandmother     No Known Problems Paternal Grandfather     Breast Cancer Sister     No Known Problems Daughter     No Known Problems Maternal Aunt      Current Outpatient Medications   Medication Instructions    cabergoline (DOSTINEX) 0.5 MG tablet No dose, route, or frequency recorded. Cyanocobalamin (VITAMIN B 12 PO) 1,000 mcg, SubLINGual    D3-50 46181 UNITS CAPS No dose, route, or frequency recorded. escitalopram (LEXAPRO) 20 mg, Oral, DAILY    fluticasone (FLONASE) 50 MCG/ACT nasal spray PLACE TWO SPRAYS IN EACH NOSTRIL ONCE DAILY    IBUPROFEN PRN    lamoTRIgine (LAMICTAL) 100 mg, Oral, DAILY    levothyroxine (SYNTHROID) 100 mcg    loratadine (CLARITIN) 10 MG tablet TAKE ONE TABLET BY MOUTH DAILY    montelukast (SINGULAIR) 10 mg, Oral, NIGHTLY    Prenatal Vit-Fe Fumarate-FA (PNV PRENATAL PLUS MULTIVITAMIN) 27-1 MG TABS No dose, route, or frequency recorded. sertraline (ZOLOFT) 100 MG tablet Take 1 1/2 tabs daily    zolpidem (AMBIEN) 10 mg, Oral, DAILY     Allergies   Allergen Reactions    Latex Itching    Acetaminophen Hives     Other reaction(s): hives    Adhesive Tape Itching    Codeine Hives    Dilaudid [Hydromorphone Hcl] Hives     Ok if takes benedryl    Hydrocodone Hives     Other reaction(s): hives    Hydromorphone Itching    Ketorolac Tromethamine Other (See Comments)    Lactose Nausea Only       ENT ROS: positive for - hearing change    General: The patient is found to be alert and normally responsive female. Hearing: grossly normal   Voice: Clear   Skin: The skin has normal colour and turgor. Face: The facial contour is symmetric at rest and with movement. Ears: The pinnae have normal contours.     AD: EAC clear, TM with t tube in place, patent  AS: EAC clear, TM with t tube in place, patent, no wetness   Eye: The ocular movements are full and symmetric, the conjunctiva is unremarkable; sclera are anicteric, pupillary response is symmetric. No nystagmus is found. Nose:   The external nasal contour is normal  The nasal mucosa has a normal appearance. The nasal septum is straight. The turbinates have a normal appearance  The nares are patent without evidence of polyposis   Oral cavity:   The dentition is healthy. The oral mucosa is without lesions;  the tongue is symmetric with full mobility and is without fasciculation. The soft palate is symmetric. The oropharynx is unremarkable. Neck: The neck has a normal contour; no masses are found on palpation        An electronic signature was used to authenticate this note.     --Mary Noonan MD     12/16/2022 8:19 AM EDT

## (undated) DEVICE — BLADE MYR OFFSET 45DEG SPEAR TIP NAR SHFT W/ RND KNURLED

## (undated) DEVICE — TOWEL,OR,DSP,ST,BLUE,STD,4/PK,20PK/CS: Brand: MEDLINE

## (undated) DEVICE — TUBING, SUCTION, 3/16" X 20', STRAIGHT: Brand: MEDLINE

## (undated) DEVICE — 4-PORT MANIFOLD: Brand: NEPTUNE 2

## (undated) DEVICE — GLOVE SURG SZ 6 THK91MIL LTX FREE SYN POLYISOPRENE ANTI